# Patient Record
Sex: MALE | Race: OTHER | Employment: FULL TIME | URBAN - METROPOLITAN AREA
[De-identification: names, ages, dates, MRNs, and addresses within clinical notes are randomized per-mention and may not be internally consistent; named-entity substitution may affect disease eponyms.]

---

## 2017-01-06 ENCOUNTER — OFFICE VISIT (OUTPATIENT)
Dept: INTERNAL MEDICINE CLINIC | Facility: CLINIC | Age: 27
End: 2017-01-06

## 2017-01-06 VITALS
WEIGHT: 286.63 LBS | DIASTOLIC BLOOD PRESSURE: 78 MMHG | HEART RATE: 69 BPM | BODY MASS INDEX: 38 KG/M2 | SYSTOLIC BLOOD PRESSURE: 131 MMHG | TEMPERATURE: 98 F

## 2017-01-06 DIAGNOSIS — S33.5XXA LUMBAR SPRAIN, INITIAL ENCOUNTER: Primary | ICD-10-CM

## 2017-01-06 PROCEDURE — 99212 OFFICE O/P EST SF 10 MIN: CPT | Performed by: INTERNAL MEDICINE

## 2017-01-06 PROCEDURE — 99204 OFFICE O/P NEW MOD 45 MIN: CPT | Performed by: INTERNAL MEDICINE

## 2017-01-06 RX ORDER — DICLOFENAC SODIUM 75 MG/1
75 TABLET, DELAYED RELEASE ORAL 2 TIMES DAILY
Qty: 14 TABLET | Refills: 0 | Status: SHIPPED | OUTPATIENT
Start: 2017-01-06 | End: 2017-02-09 | Stop reason: ALTCHOICE

## 2017-01-06 RX ORDER — CYCLOBENZAPRINE HCL 10 MG
10 TABLET ORAL NIGHTLY
Qty: 5 TABLET | Refills: 0 | Status: SHIPPED | OUTPATIENT
Start: 2017-01-06 | End: 2017-02-09 | Stop reason: ALTCHOICE

## 2017-01-06 NOTE — PROGRESS NOTES
HPI:    Patient ID: Dinora Beard is a 27year old male. I saw this patient for elevated blood sugar on 3/11/2013; over 3 years ago, considered new patient today.      HPI     Back Pain  This is a recurrent with most recent episode beginning 2 weeks ago appears well-developed and well-nourished. HENT:   Head: Normocephalic and atraumatic. Eyes: Conjunctivae are normal.   Neck: Normal range of motion. Pulmonary/Chest: Effort normal. No respiratory distress. Musculoskeletal: Normal range of motion. ordered in this encounter    Imaging & Referrals:  None       #[id number]  Attention:  This note has been scribed by Eugenie Enamorado(scribe) under the supervision of Dr. Kenji Goncalves MD on 01/06/2017 at 2:48 PM.   Lars Tafoya MD agree with the following no

## 2017-01-09 ENCOUNTER — TELEPHONE (OUTPATIENT)
Dept: INTERNAL MEDICINE CLINIC | Facility: CLINIC | Age: 27
End: 2017-01-09

## 2017-01-09 DIAGNOSIS — Z00.00 ANNUAL PHYSICAL EXAM: Primary | ICD-10-CM

## 2017-01-09 NOTE — TELEPHONE ENCOUNTER
Ordered was entered and signed for labs. Prior physical.. Please let the patient know and send a copy to the patient. Thanks.

## 2017-01-09 NOTE — TELEPHONE ENCOUNTER
Patient was seen in office on January 6, 2017 for back injury. He forgot to ask for something to provide to his work that would allow him to have an \"ergonomic desk\" assigned to him at work.  He is experiencing back pain and thinks that having this assign

## 2017-01-09 NOTE — TELEPHONE ENCOUNTER
Patient reports that he was informed by Dr Bridget Ellis to have labs performed before his scheduled annual physical. The instructions included for him to call our office to request the labs to have done this week. Physical scheduled for 1/20/2017.  Call [phone]

## 2017-01-12 NOTE — TELEPHONE ENCOUNTER
Please generate the letter as requested by the patient. Mention in the letter that due to medical issues the patient is to have ergonomic desk and share as well. Thanks.

## 2017-02-09 ENCOUNTER — OFFICE VISIT (OUTPATIENT)
Dept: INTERNAL MEDICINE CLINIC | Facility: CLINIC | Age: 27
End: 2017-02-09

## 2017-02-09 VITALS
SYSTOLIC BLOOD PRESSURE: 136 MMHG | HEIGHT: 74 IN | TEMPERATURE: 99 F | BODY MASS INDEX: 35.73 KG/M2 | HEART RATE: 57 BPM | DIASTOLIC BLOOD PRESSURE: 77 MMHG | WEIGHT: 278.38 LBS

## 2017-02-09 DIAGNOSIS — Z00.00 ANNUAL PHYSICAL EXAM: Primary | ICD-10-CM

## 2017-02-09 DIAGNOSIS — R21 RASH AND NONSPECIFIC SKIN ERUPTION: ICD-10-CM

## 2017-02-09 PROCEDURE — 99395 PREV VISIT EST AGE 18-39: CPT | Performed by: INTERNAL MEDICINE

## 2017-02-09 NOTE — PROGRESS NOTES
HPI:    Patient ID: Elida Witt is a 27year old male. HPI     Annual Preventative Exam  Pt arrives today for annual preventative physical examination. The patient complains of no new problems and has 0/10 pain.  Pt has a FHx of a stroke; his [de-identified] y/o g prescriptions on file. Allergies:No Known Allergies   PHYSICAL EXAM:   Physical Exam   Constitutional: He appears well-developed and well-nourished. No distress. HENT:   Head: Normocephalic and atraumatic.    Right Ear: Tympanic membrane and ear canal no Metabolic Panel (14)  Lipid Panel  TSH [E]  Vitamin D, 25-Hydroxy [E]    Meds This Visit:  No prescriptions requested or ordered in this encounter    Imaging & Referrals:  [facility] - INTERNAL       OB#[id number]  By signing my name below, IChris,  attest

## 2017-02-25 ENCOUNTER — LAB ENCOUNTER (OUTPATIENT)
Dept: LAB | Facility: HOSPITAL | Age: 27
End: 2017-02-25
Attending: INTERNAL MEDICINE
Payer: COMMERCIAL

## 2017-02-25 DIAGNOSIS — Z00.00 ANNUAL PHYSICAL EXAM: ICD-10-CM

## 2017-02-25 LAB
ALBUMIN SERPL BCP-MCNC: 4.2 G/DL (ref 3.5–4.8)
ALBUMIN/GLOB SERPL: 1.4 {RATIO} (ref 1–2)
ALP SERPL-CCNC: 44 U/L (ref 32–100)
ALT SERPL-CCNC: 25 U/L (ref 17–63)
ANION GAP SERPL CALC-SCNC: 9 MMOL/L (ref 0–18)
AST SERPL-CCNC: 30 U/L (ref 15–41)
BASOPHILS # BLD: 0 K/UL (ref 0–0.2)
BASOPHILS NFR BLD: 1 %
BILIRUB SERPL-MCNC: 0.7 MG/DL (ref 0.3–1.2)
BUN SERPL-MCNC: 11 MG/DL (ref 8–20)
BUN/CREAT SERPL: 9.8 (ref 10–20)
CALCIUM SERPL-MCNC: 9.3 MG/DL (ref 8.5–10.5)
CHLORIDE SERPL-SCNC: 103 MMOL/L (ref 95–110)
CHOLEST SERPL-MCNC: 158 MG/DL (ref 110–200)
CO2 SERPL-SCNC: 28 MMOL/L (ref 22–32)
CREAT SERPL-MCNC: 1.12 MG/DL (ref 0.5–1.5)
EOSINOPHIL # BLD: 0.1 K/UL (ref 0–0.7)
EOSINOPHIL NFR BLD: 2 %
ERYTHROCYTE [DISTWIDTH] IN BLOOD BY AUTOMATED COUNT: 13.1 % (ref 11–15)
GLOBULIN PLAS-MCNC: 3.1 G/DL (ref 2.5–3.7)
GLUCOSE SERPL-MCNC: 104 MG/DL (ref 70–99)
HCT VFR BLD AUTO: 47 % (ref 41–52)
HDLC SERPL-MCNC: 46 MG/DL
HGB BLD-MCNC: 15.7 G/DL (ref 13.5–17.5)
LDLC SERPL CALC-MCNC: 101 MG/DL (ref 0–99)
LYMPHOCYTES # BLD: 1.3 K/UL (ref 1–4)
LYMPHOCYTES NFR BLD: 28 %
MCH RBC QN AUTO: 29.7 PG (ref 27–32)
MCHC RBC AUTO-ENTMCNC: 33.3 G/DL (ref 32–37)
MCV RBC AUTO: 89.1 FL (ref 80–100)
MONOCYTES # BLD: 0.4 K/UL (ref 0–1)
MONOCYTES NFR BLD: 8 %
NEUTROPHILS # BLD AUTO: 2.8 K/UL (ref 1.8–7.7)
NEUTROPHILS NFR BLD: 61 %
NONHDLC SERPL-MCNC: 112 MG/DL
OSMOLALITY UR CALC.SUM OF ELEC: 290 MOSM/KG (ref 275–295)
PLATELET # BLD AUTO: 251 K/UL (ref 140–400)
PMV BLD AUTO: 7.5 FL (ref 7.4–10.3)
POTASSIUM SERPL-SCNC: 4.2 MMOL/L (ref 3.3–5.1)
PROT SERPL-MCNC: 7.3 G/DL (ref 5.9–8.4)
RBC # BLD AUTO: 5.28 M/UL (ref 4.5–5.9)
SODIUM SERPL-SCNC: 140 MMOL/L (ref 136–144)
TRIGL SERPL-MCNC: 53 MG/DL (ref 1–149)
TSH SERPL-ACNC: 1.56 UIU/ML (ref 0.34–5.6)
WBC # BLD AUTO: 4.6 K/UL (ref 4–11)

## 2017-02-25 PROCEDURE — 85025 COMPLETE CBC W/AUTO DIFF WBC: CPT

## 2017-02-25 PROCEDURE — 36415 COLL VENOUS BLD VENIPUNCTURE: CPT

## 2017-02-25 PROCEDURE — 82306 VITAMIN D 25 HYDROXY: CPT

## 2017-02-25 PROCEDURE — 80053 COMPREHEN METABOLIC PANEL: CPT

## 2017-02-25 PROCEDURE — 84443 ASSAY THYROID STIM HORMONE: CPT

## 2017-02-25 PROCEDURE — 80061 LIPID PANEL: CPT

## 2017-02-27 LAB — 25(OH)D3 SERPL-MCNC: 27.9 NG/ML

## 2017-03-14 ENCOUNTER — TELEPHONE (OUTPATIENT)
Dept: INTERNAL MEDICINE CLINIC | Facility: CLINIC | Age: 27
End: 2017-03-14

## 2017-03-16 NOTE — TELEPHONE ENCOUNTER
Recent blood tests for CBC was normal.  Normal comprehensive metabolic panel except for glucose of 104 (the patient was not fasting?). Normal cholesterol panel with LDL of under 100, triglycerides 53 and HDL 46. Thyroid tests/TSH was normal at 1.56.   Vit

## 2017-03-22 NOTE — TELEPHONE ENCOUNTER
Letter and copy of labs mailed to Pt along with recommendations from Dr. Yu Soliz. LM for Pt to call if he has any questions.

## 2017-07-28 ENCOUNTER — OFFICE VISIT (OUTPATIENT)
Dept: DERMATOLOGY CLINIC | Facility: CLINIC | Age: 27
End: 2017-07-28

## 2017-07-28 DIAGNOSIS — L30.9 DERMATITIS: Primary | ICD-10-CM

## 2017-07-28 DIAGNOSIS — L71.9 ROSACEA: ICD-10-CM

## 2017-07-28 DIAGNOSIS — D23.9 BENIGN NEOPLASM OF SKIN, UNSPECIFIED LOCATION: ICD-10-CM

## 2017-07-28 PROCEDURE — 99212 OFFICE O/P EST SF 10 MIN: CPT | Performed by: DERMATOLOGY

## 2017-07-28 PROCEDURE — 99202 OFFICE O/P NEW SF 15 MIN: CPT | Performed by: DERMATOLOGY

## 2017-07-28 RX ORDER — SULFACETAMIDE SODIUM 100 MG/ML
LOTION TOPICAL
Qty: 120 ML | Refills: 2 | Status: SHIPPED | OUTPATIENT
Start: 2017-07-28

## 2017-08-06 NOTE — PROGRESS NOTES
Alcides Butler is a 27year old male. Patient presents with:  Derm Problem: New pt presents with irritation to face x3 years. \"sometimes it burns\". Worsens when pt sweats. No treatments tried.              Review of patient's allergies indicates no k Patient presents with:  Derm Problem: New pt presents with irritation to face x3 years. \"sometimes it burns\". Worsens when pt sweats. No treatments tried. Patient presents with concerns above. Denies any other systemic complaints.   No fevers, ch differential.  Consider patch testing. Patient will let us know how they are doing over the next several weeks. Await clinical response to above therapy. Skin tag will need a referral for removal patient to request this.   Inflamed irritated removal

## (undated) NOTE — Clinical Note
1/12/2017              [address] [place] [address]         To Whom It May Concern,    Glorine Sensor is currently under my medical care. Due to patient's medical issues, he is to have an ergonomic desk and chair.

## (undated) NOTE — Clinical Note
3/22/2017              [address]         Dear Shari Khan,      It was a pleasure to see you at our [place]  office.  Your recent blood tests for CBC was normal. Normal comprehensive metabolic pan

## (undated) NOTE — MR AVS SNAPSHOT
[address]  [address] [facility]  [phone]               Thank you for choosing us for your health care visit with Esdras Hood MD.  We are glad to serve you and happy to provide you with this summary Referral Order Referred to [facility]eses[facility] Phone Visits Status Diagnosis           Lumbar sprain, initial encounter [061453]           Lumbar sprain, initial encounter [[id number]]      [facility]     Sign up for [facility], your secure online medical record.   [facility] HOW TO GET STARTED: HOW TO STAY MOTIVATED:   Start activities slowly and build up over time Do what you like   Get your heart pumping – brisk walking, biking, swimming Even 10 minute increments are effective and add up over the week   2 ½ hours per week –

## (undated) NOTE — MR AVS SNAPSHOT
[address]  [address] [facility]  [phone]               Thank you for choosing us for your health care visit with Maedline Head MD.  We are glad to serve you and happy to provide you with this summary [facility] [address]   Phone:  [phone]   Fax:  [phone]    Diagnoses:  Rash and nonspecific skin eruption   Order:  Surendra January - Internal    [facility], [address] [place] [address]   Phone:  [phone]   Fax:  [phone]         Referr medical emergencies, dial 911.            Visit [facility] online at  [email or web address]